# Patient Record
Sex: FEMALE | Race: WHITE | NOT HISPANIC OR LATINO | URBAN - METROPOLITAN AREA
[De-identification: names, ages, dates, MRNs, and addresses within clinical notes are randomized per-mention and may not be internally consistent; named-entity substitution may affect disease eponyms.]

---
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
## 2022-02-12 VITALS
WEIGHT: 147 LBS | TEMPERATURE: 98.6 F | SYSTOLIC BLOOD PRESSURE: 113 MMHG | HEART RATE: 89 BPM | DIASTOLIC BLOOD PRESSURE: 80 MMHG

## 2022-02-15 NOTE — NURSING NOTE
PPD Reading POC Entered On:  10/29/2021 8:56 AM CDT    Performed On:  10/29/2021 8:55 AM CDT by Sabina Gotti RN               PPD Reading   PPD mm of Induration :   0 mm   PPD Interpretation :   Negative   Sabina Gotti RN - 10/29/2021 8:56 AM CDT

## 2022-02-15 NOTE — NURSING NOTE
PPD Administration POC Entered On:  10/26/2021 9:48 AM CDT    Performed On:  10/26/2021 9:40 AM CDT by Sangita Floyd CMA               PPD Administration   PPD Insertion Site :   Left forearm   PPD Amount Administered (mL) :   0.1 mL   Sangita Floyd CMA - 10/26/2021 9:47 AM CDT   Details   Collection Date :   10/26/2021 9:40 AM CDT   Expiration Date :   3/25/2023 CDT   Lot#/Manufacture :   [id number]   Handling Specimen POC :   tubersol    :   Gelesis   POC Test Comments :   Pt instructed to have read in 48-72 hours Pt voiced understanding   Sangita Floyd CMA - 10/26/2021 9:47 AM CDT

## 2022-02-15 NOTE — PROGRESS NOTES
Patient:   DHARA SUNSHINE            MRN: [id number]            FIN: [id number]               Age:   21 years     Sex:  Female     :  2000   Associated Diagnoses:   Tuberculosis screening   Author:   Yossi Sewell MD      Visit Information      Date of Service: 2021 04:20 pm  Performing Location: [facility]  Encounter#: [id number]      Primary Care Provider (PCP):  LON JIMENEZ      Referring Provider:  Yossi Sewell MD    NPI# [id number]      Chief Complaint   2021 4:30 PM CST   Concerns with TB rxn - placed on L forearm yesterday. Does itch, redness spreading.        History of Present Illness   Patient is here for reaction to her PPD.  She had PPD was -2 and half weeks ago.  She had another one placed yesterday now the area is quite red.  No other complaints she has no history of TB exposures or BCG.  No cough         Review of Systems   Constitutional:  Negative.    Eye:  Negative.    Ear/Nose/Mouth/Throat:  Negative.    Respiratory:  Negative.    Cardiovascular:  Negative.    Gastrointestinal:  Negative.    Genitourinary:  Negative.    Hematology/Lymphatics:  Negative.    Endocrine:  Negative.    Immunologic:  Negative.    Musculoskeletal:  Negative.    Integumentary:  Negative except as documented in history of present illness.    Neurologic:  Negative.       Health Status   Allergies:    Allergic Reactions (Selected)  Severity Not Documented  Tetanus toxoid (No reactions were documented)  Varicella virus vaccine (No reactions were documented)   Medications:  (Selected)   Documented Medications  Documented  Nexplanon 68 mg subcutaneous implant: = 1 EA ( 68 mg ), Subcutaneous, once, 0 Refill(s), Type: Maintenance   Problem list:    All Problems  Obesity / Probable  Obesity / SNOMED CT [id number] / Probable      Histories   Past Medical History:    No active or resolved past medical history items have been selected or recorded.   Family History:    No  family history items have been selected or recorded.   Procedure history:    No active procedure history items have been selected or recorded.   Social History:        Electronic Cigarette/Vaping Assessment            Electronic Cigarette Use: Never.      Tobacco Assessment            Never (less than 100 in lifetime)        Physical Examination   Vital Signs   11/16/2021 4:30 PM CST Temperature Temporal 98.6 DegF    Peripheral Pulse Rate 89 bpm    Pulse Site Radial artery    HR Method Electronic    Systolic Blood Pressure 113 mmHg    Diastolic Blood Pressure 80 mmHg    Mean Arterial Pressure 91 mmHg    BP Site Right arm    BP Method Electronic      Measurements from flowsheet : Measurements   11/16/2021 4:30 PM CST Weight Measured - Standard 147 lb    Weight Percentile 99.81    Weight Z-score 2.89      General:  Alert and oriented, No acute distress.    Integumentary:  Patient has a 4 to 6 cm area of redness over her left forearm with a about a 1 cm area of induration  .       Impression and Plan   Diagnosis     Tuberculosis screening (FJP58-RJ Z11.1).     Tuberculosis screening (WJJ71-CG Z11.1).     Course:  Not progressing as expected.    Plan:  Patient with reaction to PPD currently possibly a positive test will get a clot from Cleaning she will still have her test read at 48 to 72 hours.  Discussed the implications of a positive test  .

## 2022-02-15 NOTE — NURSING NOTE
PPD Administration POC Entered On:  11/15/2021 3:29 PM CST    Performed On:  11/15/2021 3:20 PM CST by Sangita Floyd CMA               PPD Administration   PPD Insertion Site :   Left forearm   PPD Amount Administered (mL) :   0.1 mL   Sangita Floyd CMA - 11/15/2021 3:28 PM CST   Details   Collection Date :   11/15/2021 3:20 PM CST   Expiration Date :   3/25/2023 CDT   Lot#/Manufacture :   [id number]   Handling Specimen POC :   tubersol    :   Zurff   POC Test Comments :   Pt intstructed to have read in 48-72 hours.  Pt voiced understanding   Sangita Floyd CMA - 11/15/2021 3:28 PM CST

## 2022-02-15 NOTE — NURSING NOTE
PPD Reading POC Entered On:  11/17/2021 2:47 PM CST    Performed On:  11/17/2021 2:47 PM CST by Sangita Floyd CMA               PPD Reading   PPD mm of Induration :   0 mm   PPD Interpretation :   Negative   PPD Completion Status :   Completed   Sangita Floyd CMA - 11/17/2021 2:47 PM CST

## 2022-02-15 NOTE — NURSING NOTE
Comprehensive Intake Entered On:  11/16/2021 4:36 PM CST    Performed On:  11/16/2021 4:30 PM CST by Apryl Molina CMA               Summary   Chief Complaint :   Concerns with TB rxn - placed on L forearm yesterday. Does itch, redness spreading.     Weight Measured :   147 lb(Converted to: 147 lb 0 oz, 66.678 kg)    Systolic Blood Pressure :   113 mmHg   Diastolic Blood Pressure :   80 mmHg   Mean Arterial Pressure :   91 mmHg   Peripheral Pulse Rate :   89 bpm   BP Site :   Right arm   Pulse Site :   Radial artery   BP Method :   Electronic   HR Method :   Electronic   Temperature Temporal :   98.6 DegF(Converted to: 37.0 DegC)    Apryl Molina CMA - 11/16/2021 4:30 PM CST   Health Status   Allergies Verified? :   Yes   Medication History Verified? :   Yes   Pre-Visit Planning Status :   Not completed   Apryl Molina CMA - 11/16/2021 4:30 PM CST   Meds / Allergies   (As Of: 11/16/2021 4:36:29 PM CST)   Allergies (Active)   tetanus toxoid  Estimated Onset Date:   Unspecified ; Created By:   [employer] User for [id number]; Reaction Status:   Active ; Substance:   tetanus toxoid ; Updated By:   [employer] User for [id number]; Reviewed Date:   1/3/2011 12:00 AM CST      varicella virus vaccine  Estimated Onset Date:   Unspecified ; Created By:   [employer] User for [id number]; Reaction Status:   Active ; Substance:   varicella virus vaccine ; Updated By:   [employer] User for [id number]; Reviewed Date:   1/3/2011 12:00 AM CST        Medication List   (As Of: 11/16/2021 4:36:29 PM CST)   Home Meds    etonogestrel  :   etonogestrel ; Status:   Documented ; Ordered As Mnemonic:   Nexplanon 68 mg subcutaneous implant ; Simple Display Line:   68 mg, 1 EA, Subcutaneous, once, 0 Refill(s) ; Catalog Code:   etonogestrel ; Order Dt/Tm:   11/16/2021 4:34:19 PM CST            Social History   Social History   (As Of: 11/16/2021 4:36:29 PM CST)   Tobacco:        Never (less than 100 in lifetime)   (Last Updated:  11/16/2021 4:34:37 PM CST by Apryl Molina CMA)          Electronic Cigarette/Vaping:        Electronic Cigarette Use: Never.   (Last Updated: 11/16/2021 4:34:40 PM CST by Apryl Molina CMA)

## 2023-01-17 ENCOUNTER — NURSE TRIAGE (OUTPATIENT)
Dept: NURSING | Facility: CLINIC | Age: 23
End: 2023-01-17

## 2023-01-17 NOTE — TELEPHONE ENCOUNTER
"Patient had Leg surgery for a broken leg was on Sunday at 830am at [facility].   -broken in 3 places   -henrique and 6 screws    Took her last dose of pain pills  -oxycodone at 2pm     Having severe pain and crying and worried because she cannot feel her pinky toe.     Mother states that \"this number\" was on the AVS    Mother very agitated that we have no records from the patient's surgery.     Mother states \"I will just call the other number!\" and then disconnected call.     Reason for Disposition    [1] SEVERE post-op pain (e.g., excruciating, pain scale 8-10) AND [2] not controlled with pain medications    Caller hangs up    Protocols used: POST-OP SYMPTOMS AND [id number], DIFFICULT CALLER-DIANA Jean Baptiste RN on 1/17/2023 at 5:25 AM      "

## 2023-01-19 ENCOUNTER — TELEPHONE (OUTPATIENT)
Dept: FAMILY MEDICINE | Facility: CLINIC | Age: 23
End: 2023-01-19

## 2023-01-19 NOTE — TELEPHONE ENCOUNTER
Medication Question or Refill    Patients mom hung up in transfer, making patient call and sending to clinic of care        What medication are you calling about (include dose and sig)?:   Patients mom calling wanting refill of Oxycodone, see triage from 1/17/2023    Who prescribed the medication?: Surgeon, it was explain to patients mom to call surgeon as we did not prescribe this medication. Mom says follow up directions day to call the [facility]     Do you need a refill? Yes:     When did you use the medication last? Patient is nearly out    Patient offered an appointment? No, needs hospital follow up. LOV was 11/16/2021 with [facility]      Do you have any questions or concerns?  Yes: patient still in pain, see triage    Preferred Pharmacy:    Okay to leave a detailed message?: Yes at Cell number on file:    Telephone Information:   Mobile [phone]     LEILA Alvarez  [facility]  
VM left, requesting return call to schedule in person visit for medication refill.   
80.7